# Patient Record
Sex: FEMALE | Race: WHITE | Employment: FULL TIME | URBAN - METROPOLITAN AREA
[De-identification: names, ages, dates, MRNs, and addresses within clinical notes are randomized per-mention and may not be internally consistent; named-entity substitution may affect disease eponyms.]

---

## 2017-06-08 ENCOUNTER — OFFICE VISIT (OUTPATIENT)
Dept: INTERNAL MEDICINE CLINIC | Age: 52
End: 2017-06-08

## 2017-06-08 VITALS
WEIGHT: 190.5 LBS | BODY MASS INDEX: 37.4 KG/M2 | HEART RATE: 85 BPM | HEIGHT: 60 IN | RESPIRATION RATE: 16 BRPM | OXYGEN SATURATION: 98 % | SYSTOLIC BLOOD PRESSURE: 116 MMHG | DIASTOLIC BLOOD PRESSURE: 80 MMHG | TEMPERATURE: 98 F

## 2017-06-08 DIAGNOSIS — Z12.11 COLON CANCER SCREENING: ICD-10-CM

## 2017-06-08 DIAGNOSIS — Z00.00 ROUTINE GENERAL MEDICAL EXAMINATION AT A HEALTH CARE FACILITY: Primary | ICD-10-CM

## 2017-06-08 PROBLEM — E66.01 MORBID OBESITY (HCC): Status: ACTIVE | Noted: 2017-06-08

## 2017-06-08 RX ORDER — PHENTERMINE HYDROCHLORIDE 37.5 MG/1
CAPSULE ORAL
COMMUNITY
Start: 2017-05-09 | End: 2017-06-08

## 2017-06-08 NOTE — PROGRESS NOTES
HISTORY OF PRESENT ILLNESS  Man Dunbar is a 54 y.o. female. 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 1 Inhaler 3    CYANOCOBALAMIN, VITAMIN B-12, (VITAMIN B-12 PO) Take 5,000 mcg by mouth daily.  CHOLECALCIFEROL, VITAMIN D3, (VITAMIN D3 PO) Take 5,000 Units by mouth.  thyroid, Pork, (ARMOUR THYROID) 60 mg tablet Take  by mouth daily.  butalbital-acetaminophen-caffeine (FIORICET) -40 mg per tablet Take 1 tablet by mouth every six (6) hours as needed for Pain. 20 tablet 2     Allergies   Allergen Reactions    Latex Rash    Tylenol [Acetaminophen] Other (comments)     Sleepiness. So avoids but can use exedrin which has tylenol. Social History   Substance Use Topics    Smoking status: Never Smoker    Smokeless tobacco: Never Used    Alcohol use 1.0 oz/week     2 Shots of liquor per week           ROS    Physical Exam   Constitutional: She appears well-developed and well-nourished. Appears stated age, obese, nad   Cardiovascular: Normal rate, regular rhythm and normal heart sounds. Exam reveals no gallop and no friction rub. No murmur heard. Pulmonary/Chest: Effort normal and breath sounds normal. No respiratory distress. She has no wheezes. Abdominal: Soft. Bowel sounds are normal.   Musculoskeletal: She exhibits no edema. Neurological: She is alert. Psychiatric: She has a normal mood and affect. Nursing note and vitals reviewed. ASSESSMENT and Heather Sarkar was seen today for complete physical.    Diagnoses and all orders for this visit:    Routine general medical examination at a health care facility   utd screening labs   Will work on diet and exercise for weight reduction   Continue to see gyn MD annually for well woman care   Refer to colonoscopy when turns 52 this year    Prediabetes   Per Dr Jaja Angulo  rtc 1 year    Follow-up Disposition:  Return in about 1 year (around 6/8/2018) for cpe.

## 2017-06-08 NOTE — MR AVS SNAPSHOT
Visit Information Date & Time Provider Department Dept. Phone Encounter #  
 6/8/2017  9:30 AM Darci Vo, [address] [place] [address],[address] [phone] [id number] Follow-up Instructions Return in about 1 year (around 6/8/2018) for cpe. Upcoming Health Maintenance Date Due  
 PAP AKA CERVICAL CYTOLOGY 4/14/2017 INFLUENZA AGE 9 TO ADULT 8/1/2017 DTaP/Tdap/Td series (2 - Td) 6/7/2026 Allergies as of 6/8/2017  Review Complete On: 6/8/2017 By: Phineas Loss, LPN Severity Noted Reaction Type Reactions Latex  11/19/2010    Rash Tylenol [Acetaminophen]  11/19/2010    Other (comments) Sleepiness. So avoids but can use exedrin which has tylenol. Current Immunizations  Reviewed on 6/7/2016 Name Date Influenza Vaccine 10/15/2014, 9/27/2013 Influenza Vaccine Split 10/19/2010 Tdap 6/7/2016 Not reviewed this visit You Were Diagnosed With   
  
 Codes Comments Routine general medical examination at a health care facility    -  Primary ICD-10-CM: Z00.00 ICD-9-CM: V70.0 Colon cancer screening     ICD-10-CM: Z12.11 ICD-9-CM: V76.51 Vitals BP Pulse Temp Resp Height(growth percentile) Weight(growth percentile) 116/80 (BP 1 Location: Left arm, BP Patient Position: Sitting) 85 98 °F (36.7 °C) (Oral) 16 5' (1.524 m) 190 lb 8 oz (86.4 kg) LMP SpO2 BMI OB Status Smoking Status (Approximate) 98% 37.2 kg/m2 Having regular periods Never Smoker BMI and BSA Data Body Mass Index Body Surface Area  
 37.2 kg/m 2 1.91 m 2 Preferred Pharmacy Pharmacy Name Phone [facility] [address] [phone] Your Updated Medication List  
  
   
This list is accurate as of: 6/8/17 10:31 AM.  Always use your most recent med list.  
  
  
  
  
 albuterol 90 mcg/actuation inhaler Commonly known as:  PROAIR HFA Take 1 Puff by inhalation every four (4) hours as needed for Wheezing or Shortness of Breath. ARMOUR THYROID 60 mg tablet Generic drug:  thyroid (Pork) Take  by mouth daily. butalbital-acetaminophen-caffeine -40 mg per tablet Commonly known as:  [employer] Take 1 tablet by mouth every six (6) hours as needed for Pain. VITAMIN B-12 PO Take 5,000 mcg by mouth daily. VITAMIN D3 PO Take 5,000 Units by mouth. We Performed the Following REFERRAL TO GASTROENTEROLOGY [ILL77 Custom] Comments:  
 Please evaluate patient for screening colonoscopy Follow-up Instructions Return in about 1 year (around 6/8/2018) for cpe. Referral Information Referral ID Referred By Referred To  
  
 [id number] [place], [address]   
   [address] [facility] [address], [address] [facility] Visits Status Start Date End Date 1 New Request 6/8/17 6/8/18 If your referral has a status of pending review or denied, additional information will be sent to support the outcome of this decision. Introducing [facility] & HEALTH SERVICES! Dear Matheus Call: Thank you for requesting a [employer] account. Our records indicate that you already have an active [employer] account. You can access your account anytime at [email or web address] Did you know that you can access your hospital and ER discharge instructions at any time in [employer]? You can also review all of your test results from your hospital stay or ER visit. Additional Information If you have questions, please visit the Frequently Asked Questions section of the [employer] website at [email or web address] Remember, [employer] is NOT to be used for urgent needs. For medical emergencies, dial 911. Now available from your iPhone and Android! Please provide this summary of care documentation to your next provider. Your primary care clinician is listed as Reyna GOINS.  If you have any questions after today's visit, please call [phone].

## 2017-06-08 NOTE — PROGRESS NOTES
Chief Complaint   Patient presents with    Complete Physical     Reviewed record In preparation for visit and have obtained necessary documentation    1. Have you been to the ER, urgent care clinic since your last visit? Hospitalized since your last visit? NO    2. Have you seen or consulted any other health care providers outside of the [facility] since your last visit? Include any pap smears or colon screening. NO    Patient does not have advance directive on file and has received paperwork.

## 2017-07-07 ENCOUNTER — OFFICE VISIT (OUTPATIENT)
Dept: INTERNAL MEDICINE CLINIC | Age: 52
End: 2017-07-07

## 2017-07-07 ENCOUNTER — HOSPITAL ENCOUNTER (OUTPATIENT)
Dept: GENERAL RADIOLOGY | Age: 52
Discharge: HOME | End: 2017-07-07
Payer: COMMERCIAL

## 2017-07-07 VITALS
SYSTOLIC BLOOD PRESSURE: 110 MMHG | OXYGEN SATURATION: 95 % | RESPIRATION RATE: 16 BRPM | HEIGHT: 60 IN | TEMPERATURE: 98.5 F | DIASTOLIC BLOOD PRESSURE: 72 MMHG | HEART RATE: 96 BPM | BODY MASS INDEX: 38.24 KG/M2 | WEIGHT: 194.8 LBS

## 2017-07-07 DIAGNOSIS — R04.2 HEMOPTYSIS: Primary | ICD-10-CM

## 2017-07-07 DIAGNOSIS — R04.2 HEMOPTYSIS: ICD-10-CM

## 2017-07-07 PROCEDURE — 71020 XR CHEST PA LAT: CPT

## 2017-07-07 RX ORDER — AMOXICILLIN AND CLAVULANATE POTASSIUM 875; 125 MG/1; MG/1
1 TABLET, FILM COATED ORAL EVERY 12 HOURS
Qty: 14 TAB | Refills: 0 | Status: SHIPPED | OUTPATIENT
Start: 2017-07-07 | End: 2017-07-14

## 2017-07-07 NOTE — PROGRESS NOTES
Please call the patient and let the patient know that her test result(s) is/are normal.  Thanks. David Armenta.

## 2017-08-27 ENCOUNTER — PATIENT MESSAGE (OUTPATIENT)
Dept: INTERNAL MEDICINE CLINIC | Age: 52
End: 2017-08-27

## 2017-08-27 DIAGNOSIS — E03.9 ACQUIRED HYPOTHYROIDISM: Primary | ICD-10-CM

## 2017-08-27 DIAGNOSIS — R73.03 PRE-DIABETES: ICD-10-CM

## 2017-08-28 NOTE — TELEPHONE ENCOUNTER
From: Betzy Flores  To: Chayito Mckeon MD  Sent: 8/27/2017 8:51 AM EDT  Subject: Referral Request    Hello! Due to bad service I have left Dr. Royal Rasmussen, [facility], who I was working with for my thyroid and pre-diabetes. I contacted [facility] several months ago regarding an appt with Internal Medicine Endocrinology. I received my first communication from [address] [place] [address] and they have requested a referral from my primary physician which is you. Address is: [facility], [facility], [address] [place] [address], Attn: Elizabeth Bhagat. Thank you for your time and attention to this item. Please let me know if you have any questions. Betzy Flores (h) [phone] (w) [phone].

## 2017-08-28 NOTE — TELEPHONE ENCOUNTER
Per Dr. Marcela Yeung for referral to endocrinology at [facility], [facility], [address] [place] [address] per patients request.

## 2017-10-03 ENCOUNTER — HOSPITAL ENCOUNTER (OUTPATIENT)
Dept: DIABETES SERVICES | Age: 52
Discharge: HOME | End: 2017-10-03

## 2017-10-03 DIAGNOSIS — R73.03 PREDIABETES: Primary | ICD-10-CM

## 2017-10-03 NOTE — PROGRESS NOTES
Was asked by Luis Alberto Segura with the Diabetes [facility] to put in an order for pre- dm education as she and her  are there for education.  Received verbal order from Dr. Evelia Caceres to put in the referral.

## 2017-10-04 ENCOUNTER — TELEPHONE (OUTPATIENT)
Dept: INTERNAL MEDICINE CLINIC | Age: 52
End: 2017-10-04

## 2017-10-04 NOTE — TELEPHONE ENCOUNTER
----- Message from Mayra Verdugo sent at 10/4/2017  1:26 PM EDT -----  Regarding: Dr. Martita Szymanski,  is calling on behalf of pt regarding the pt getting lab work done during her upcoming appt scheduled for 12/14/17. This appt will replace the endocrinology appt. Pt needs to have lab work done prior to the appt. Mr. Ella Drummond can be reached at [phone].     Message copied/pasted from [facility]

## 2017-10-04 NOTE — DIABETES MGMT
DTC Progress Note    Recommendations/ Comments: Pt encouraged to continue to follow-up with PCP for evaluation of her pre-DM and contact educator is she has any questions in the future. Chart reviewed on [employer]. Patient is a 54 y.o. female with known Pre-DM and thyroid disorder. Pt is seen individually due to pre-DM and no classes. Pt and her , Stevenson Wagner are seen together per their request for education. Both shared that they are struggling with wt loss. We discussed risk factors for pre-DM and guidelines of 5-7% of current wt as recommended goal to improve insulin resistance. [facility] reports that she has started walking at home and being more active on the weekend. She is struggling to find time in her day to include activity. We discussed FITT principle and how this could be used to overcome plateaus in wt loss. We also discussed how the exercise will assist with decreasing insulin resistance. Provided [facility] with chair based exercises that she may find helpful for not just flexibility but also with stress management at work. We discussed taking \"activity breaks\" every 90 minutes. Pt shared that \"I have a window in my office and they are going to wonder what is she doing in there? \"       Pt is currently eating 3 meals - using appropriate portions of protein, including carbohydrates with meals. Per [facility], she is finding it hard to meet guidelines of 1/2 plate vegetable as Stevenson Wagner is primarily responsible for preparing meals. She shared that she is trying to add salads, steamed veggies w/in her preferences but still knows there is room for improvement. She is often including fruit with other meals. She express concern about not being able to have light yogurt or oatmeal which she had been trying to include with breakfasts. She asked appropriate questions related to sweets and sugar substitutes.   She has been trying to limit her kcal intakes - at times severely restricting kcal to <1000 per day. We discussed the negative impact this may have on glycemic control and hepatic release of glycogen. A1c:   Sherrill Ortega provided her recent lab work for review with LIEN PRITCHETT today - she keeps everything with her and has been following her A1c. Hemoglobin A1c (completed by lab per MD order 6.27.17) 6%.     Lab Results   Component Value Date/Time    Creatinine 1.01 12/20/2010 12:00 AM     Thank you  Clarence PRITCHETT

## 2017-10-05 DIAGNOSIS — Z00.00 ROUTINE GENERAL MEDICAL EXAMINATION AT A HEALTH CARE FACILITY: Primary | ICD-10-CM

## 2017-10-31 ENCOUNTER — TELEPHONE (OUTPATIENT)
Dept: INTERNAL MEDICINE CLINIC | Age: 52
End: 2017-10-31

## 2017-10-31 RX ORDER — LEVOTHYROXINE AND LIOTHYRONINE 38; 9 UG/1; UG/1
15 TABLET ORAL DAILY
Qty: 90 TAB | Refills: 3 | Status: SHIPPED | OUTPATIENT
Start: 2017-10-31

## 2017-10-31 NOTE — TELEPHONE ENCOUNTER
Corinne Deleon would like a call back regarding patient's Thyroid medication.  Contact is [phone]         Message received & copied from [facility]

## 2017-11-03 ENCOUNTER — TELEPHONE (OUTPATIENT)
Dept: INTERNAL MEDICINE CLINIC | Age: 52
End: 2017-11-03

## 2017-11-03 ENCOUNTER — DOCUMENTATION ONLY (OUTPATIENT)
Dept: INTERNAL MEDICINE CLINIC | Age: 52
End: 2017-11-03

## 2017-11-03 ENCOUNTER — TELEPHONE (OUTPATIENT)
Dept: NEUROLOGY | Age: 52
End: 2017-11-03

## 2017-11-03 NOTE — TELEPHONE ENCOUNTER
Spoke with patient after 2 patient identifiers being note and advised per Dr. Lula Marquis that he wanted to see her. Patient expressed understanding and has no further questions at this time.

## 2017-11-03 NOTE — TELEPHONE ENCOUNTER
Jenna Mclean can see her on Monday, 13 November, but see if we have a cancellation or a spot to put her in if anybody else has an opening next week

## 2017-11-03 NOTE — TELEPHONE ENCOUNTER
Patient called states that she had a colonoscopy today as she was coming through she had a seizure, she contacted her PCP Elan Arenas who spk with .  Patient was told that Nargis Parks would fit her in for an apptmt

## 2017-11-03 NOTE — PROGRESS NOTES
Callled by Dr Ondina Cote at [address] [facility] endoscopy center. Pt has colonoscopy this morning and after completion in recovery and seizure lasting 5 minutes with arching of back and eyes rolled back. Pt did not recieive any ativan or versed to stop the seizure. No respiratory distress per MD. Pt and  refused to go to ED. We have called the patient today and she refuses to come in to our office for evaluation. She is refusing to restart topamax. Pt states she was still awake and alert when the seizure occurred today. I have placed a call to the neurologists office. I spoke with neurologist Dr Homero Kimbrough who has offered to have pt worked in for an appt soon for sz vs pseudoseizure.   It was reiterated to patient--no driving for 6 months or until she sees neurologist  Pt states she will call the neurologists office for the appt

## 2017-11-06 NOTE — TELEPHONE ENCOUNTER
I spoke with the patient and scheduled her for a follow up with Dr Ilda Goldberg as he had an opening tomorrow

## 2017-11-07 ENCOUNTER — OFFICE VISIT (OUTPATIENT)
Dept: NEUROLOGY | Age: 52
End: 2017-11-07

## 2017-11-07 VITALS
SYSTOLIC BLOOD PRESSURE: 128 MMHG | DIASTOLIC BLOOD PRESSURE: 70 MMHG | HEART RATE: 70 BPM | OXYGEN SATURATION: 98 % | BODY MASS INDEX: 38.86 KG/M2 | WEIGHT: 199 LBS

## 2017-11-07 DIAGNOSIS — G25.3 MULTIFOCAL MYOCLONUS: Primary | ICD-10-CM

## 2017-11-07 NOTE — MR AVS SNAPSHOT
Visit Information Date & Time Provider Department Dept. Phone Encounter #  
 11/7/2017  9:40 AM Chandler Clemons MD [facility] [phone] [id number] Your Appointments 12/14/2017  9:00 AM  
ROUTINE CARE with Luz Elena Weller, [address] [facility]) Appt Note: f/up [facility] [address] [facility]  
[phone]  
  
   
 [facility] [address] [facility]  [address] [address]  
  
    
 6/15/2018  8:15 AM  
ROUTINE CARE with Luz Elena Weller, [address] [facility]) Appt Note: f/u yearly [facility] [address] [facility]  
[phone] Upcoming Health Maintenance Date Due  
 PAP AKA CERVICAL CYTOLOGY 4/14/2017 BREAST CANCER SCRN MAMMOGRAM 10/12/2017 FOBT Q 1 YEAR AGE 50-75 10/12/2017 DTaP/Tdap/Td series (2 - Td) 6/7/2026 Allergies as of 11/7/2017  Review Complete On: 11/7/2017 By: Willam Brown LPN Severity Noted Reaction Type Reactions Latex  11/19/2010    Rash Other Medication  07/07/2017    Other (comments) Steroids Tylenol [Acetaminophen]  11/19/2010    Other (comments) Sleepiness. So avoids but can use exedrin which has tylenol. Current Immunizations  Reviewed on 6/7/2016 Name Date Influenza Vaccine 10/12/2017, 10/15/2014, 9/27/2013 Influenza Vaccine Split 10/19/2010 Tdap 6/7/2016 Not reviewed this visit Vitals BP Pulse Weight(growth percentile) SpO2 BMI OB Status 128/70 70 199 lb (90.3 kg) 98% 38.86 kg/m2 Having regular periods Smoking Status Never Smoker Vitals History BMI and BSA Data Body Mass Index Body Surface Area  
 38.86 kg/m 2 1.96 m 2 Preferred Pharmacy Pharmacy Name Phone [facility] [address] [place] [phone] Your Updated Medication List  
  
   
 This list is accurate as of: 11/7/17  9:40 AM.  Always use your most recent med list.  
  
  
  
  
 albuterol 90 mcg/actuation inhaler Commonly known as:  PROAIR HFA Take 1 Puff by inhalation every four (4) hours as needed for Wheezing or Shortness of Breath. thyroid (Pork) 60 mg tablet Commonly known as:  ARMOUR THYROID Take 0.25 Tabs by mouth daily. VITAMIN B-12 PO Take 5,000 mcg by mouth daily. VITAMIN D3 PO Take 5,000 Units by mouth. Patient Instructions PRESCRIPTION REFILL POLICY [facility] Statement to Patients April 1, 2014 In an effort to ensure the large volume of patient prescription refills is processed in the most efficient and expeditious manner, we are asking our patients to assist us by calling your Pharmacy for all prescription refills, this will include also your  Mail Order Pharmacy. The pharmacy will contact our office electronically to continue the refill process. Please do not wait until the last minute to call your pharmacy. We need at least 48 hours (2days) to fill prescriptions. We also encourage you to call your pharmacy before going to  your prescription to make sure it is ready. With regard to controlled substance prescription refill requests (narcotic refills) that need to be picked up at our office, we ask your cooperation by providing us with at least 72 hours (3days) notice that you will need a refill. We will not refill narcotic prescription refill requests after 4:00pm on any weekday, Monday through Thursday, or after 2:00pm on Fridays, or on the weekends. We encourage everyone to explore another way of getting your prescription refill request processed using [employer], our patient web portal through our electronic medical record system.  [employer] is an efficient and effective way to communicate your medication request directly to the office and downloadable as an toi on your smart phone . [employer] also features a review functionality that allows you to view your medication list as well as leave messages for your physician. Are you ready to get connected? If so please review the attatched instructions or speak to any of our staff to get you set up right away! Thank you so much for your cooperation. Should you have any questions please contact our Practice Administrator. The Physicians and Staff,  [facility] Introducing [facility] & [facility]! Dear Kenda Paget: Thank you for requesting a [employer] account. Our records indicate that you already have an active [employer] account. You can access your account anytime at [email or web address] Did you know that you can access your hospital and ER discharge instructions at any time in [employer]? You can also review all of your test results from your hospital stay or ER visit. Additional Information If you have questions, please visit the Frequently Asked Questions section of the [employer] website at [email or web address] Remember, [employer] is NOT to be used for urgent needs. For medical emergencies, dial 911. Now available from your iPhone and Android! Please provide this summary of care documentation to your next provider. Your primary care clinician is listed as Samuel GOINS. If you have any questions after today's visit, please call [phone].

## 2017-11-07 NOTE — PROGRESS NOTES
HISTORY OF PRESENT ILLNESS  Shelby Mayes is a 50 y.o. female. HPI Comments: Shelby Mayes is a 81-year-old right-handed  female who is here today for an episode after a colonoscopy that was thought at the time to be a seizure. She has a history of behavioral seizures. She was told at [facility] that she had these. The patient is relatively well adjusted, ,  employed. She remembers the entire episode. She remembers her arms jerking and twitching she remembers the staff running around at the time. The entire episode was fairly short and she was able to dress herself and leave after the colonoscopy. In 2009 she was evaluated at [address] [place] [address] and found to have abnormal movements when hooked up to the EEG and exposed to photic stimulation. She is well now and not on any anticonvulsants. Seizure    The history is provided by the spouse. Review of Systems   Constitutional: Negative. Cardiovascular: Negative. Psychiatric/Behavioral: Negative. Current Outpatient Prescriptions on File Prior to Visit   Medication Sig Dispense Refill    thyroid, Pork, (ARMOUR THYROID) 60 mg tablet Take 0.25 Tabs by mouth daily. 90 Tab 3    albuterol (PROAIR HFA) 90 mcg/actuation inhaler Take 1 Puff by inhalation every four (4) hours as needed for Wheezing or Shortness of Breath. 1 Inhaler 3    CYANOCOBALAMIN, VITAMIN B-12, (VITAMIN B-12 PO) Take 5,000 mcg by mouth daily.  CHOLECALCIFEROL, VITAMIN D3, (VITAMIN D3 PO) Take 5,000 Units by mouth. No current facility-administered medications on file prior to visit.       Past Medical History:   Diagnosis Date    Asthma     Dizziness     Epilepsy ([address]     Migraine     Seizures ([facility])      Family History   Problem Relation Age of Onset    Psychiatric Disorder Mother      schizophrenia and multiple personality d/o    Cancer Maternal Grandmother     Heart Disease Maternal Grandmother     Stroke Maternal Grandmother     Cancer Maternal Grandfather     Cancer Paternal Grandmother     Cancer Paternal Grandfather     Diabetes Paternal Grandfather     Heart Disease Paternal Grandfather     Cancer Sister      /70  Pulse 70  Wt 199 lb (90.3 kg)  SpO2 98%  BMI 38.86 kg/m2      Physical Exam   Constitutional: She is oriented to person, place, and time. She appears well-developed and well-nourished. No distress. HENT:   Head: Normocephalic and atraumatic. Mouth/Throat: Oropharyngeal exudate present. Eyes: Conjunctivae and EOM are normal. Pupils are equal, round, and reactive to light. No scleral icterus. Neck: Normal range of motion. Neck supple. No thyromegaly present. Cardiovascular: Normal rate, regular rhythm and normal heart sounds. No murmur heard. Musculoskeletal: Normal range of motion. She exhibits no edema, tenderness or deformity. Lymphadenopathy:     She has no cervical adenopathy. Neurological: She is alert and oriented to person, place, and time. She has normal strength and normal reflexes. She displays no atrophy and no tremor. No cranial nerve deficit or sensory deficit. She exhibits normal muscle tone. She displays a negative Romberg sign. Coordination and gait normal. She displays no Babinski's sign on the right side. She displays no Babinski's sign on the left side. Speech,language and mentation are normal  Visual fields are full to confrontation, funduscopic exam reveals flat discs,  the retina and vasculature are normal     Skin: Skin is warm and dry. No rash noted. She is not diaphoretic. No erythema. Psychiatric: She has a normal mood and affect. Her behavior is normal. Judgment and thought content normal.   Vitals reviewed. ASSESSMENT and PLAN  SEIZURE LIKE EPISODE  This was not a generalized seizure, the patient was conscious for the entire episode. I am not sure whether this represents some kind of mild clonus or an actual behavioral spell.   The patient is remarkably well adjusted as compared to the typical pseudoseizure patient and this was right after propofol anesthesia. I see no reason for further workup at this time. I would probably avoid propofol in the future. We will see her back on an as-needed basis. This note will not be viewable in [address]

## 2017-11-07 NOTE — LETTER
11/7/2017 9:50 AM 
 
Patient:  Kam Wagner YOB: 1965 Date of Visit: 11/7/2017 Dear Cheikh Harris MD 
[address] [address] VIA In Basket 
 : Thank you for referring Ms. Kam Wagner to me for evaluation/treatment. Below are the relevant portions of my assessment and plan of care. HISTORY OF PRESENT ILLNESS Kam Wagner is a 50 y.o. female. HPI Comments: Kam Wagner is a 53-year-old right-handed  female who is here today for an episode after a colonoscopy that was thought at the time to be a seizure. She has a history of behavioral seizures. She was told at [facility] that she had these. The patient is relatively well adjusted, ,  employed. She remembers the entire episode. She remembers her arms jerking and twitching she remembers the staff running around at the time. The entire episode was fairly short and she was able to dress herself and leave after the colonoscopy. In 2009 she was evaluated at [facility] and found to have abnormal movements when hooked up to the EEG and exposed to photic stimulation. She is well now and not on any anticonvulsants. Seizure The history is provided by the spouse. Review of Systems Constitutional: Negative. Cardiovascular: Negative. Psychiatric/Behavioral: Negative. Current Outpatient Prescriptions on File Prior to Visit Medication Sig Dispense Refill  thyroid, Pork, (ARMOUR THYROID) 60 mg tablet Take 0.25 Tabs by mouth daily. 90 Tab 3  
 albuterol (PROAIR HFA) 90 mcg/actuation inhaler Take 1 Puff by inhalation every four (4) hours as needed for Wheezing or Shortness of Breath. 1 Inhaler 3  
 CYANOCOBALAMIN, VITAMIN B-12, (VITAMIN B-12 PO) Take 5,000 mcg by mouth daily.  CHOLECALCIFEROL, VITAMIN D3, (VITAMIN D3 PO) Take 5,000 Units by mouth. No current facility-administered medications on file prior to visit. Past Medical History:  
Diagnosis Date  Asthma  Dizziness  Epilepsy ([facility]  Migraine  Seizures ([facility] Family History Problem Relation Age of Onset  Psychiatric Disorder Mother   
  schizophrenia and multiple personality d/o  Cancer Maternal Grandmother  Heart Disease Maternal Grandmother  Stroke Maternal Grandmother  Cancer Maternal Grandfather  Cancer Paternal Grandmother  Cancer Paternal Grandfather  Diabetes Paternal Grandfather  Heart Disease Paternal Grandfather  Cancer Sister /70  Pulse 70  Wt 199 lb (90.3 kg)  SpO2 98%  BMI 38.86 kg/m2 Physical Exam  
Constitutional: She is oriented to person, place, and time. She appears well-developed and well-nourished. No distress. HENT:  
Head: Normocephalic and atraumatic. Mouth/Throat: Oropharyngeal exudate present. Eyes: Conjunctivae and EOM are normal. Pupils are equal, round, and reactive to light. No scleral icterus. Neck: Normal range of motion. Neck supple. No thyromegaly present. Cardiovascular: Normal rate, regular rhythm and normal heart sounds. No murmur heard. Musculoskeletal: Normal range of motion. She exhibits no edema, tenderness or deformity. Lymphadenopathy:  
  She has no cervical adenopathy. Neurological: She is alert and oriented to person, place, and time. She has normal strength and normal reflexes. She displays no atrophy and no tremor. No cranial nerve deficit or sensory deficit. She exhibits normal muscle tone. She displays a negative Romberg sign. Coordination and gait normal. She displays no Babinski's sign on the right side. She displays no Babinski's sign on the left side. Speech,language and mentation are normal 
Visual fields are full to confrontation, funduscopic exam reveals flat discs,  the retina and vasculature are normal 
  
Skin: Skin is warm and dry. No rash noted. She is not diaphoretic. No erythema. Psychiatric: She has a normal mood and affect. Her behavior is normal. Judgment and thought content normal.  
Vitals reviewed. ASSESSMENT and PLAN 
SEIZURE LIKE EPISODE This was not a generalized seizure, the patient was conscious for the entire episode. I am not sure whether this represents some kind of mild clonus or an actual behavioral spell. The patient is remarkably well adjusted as compared to the typical pseudoseizure patient and this was right after propofol anesthesia. I see no reason for further workup at this time. I would probably avoid propofol in the future. We will see her back on an as-needed basis. This note will not be viewable in [address] If you have questions, please do not hesitate to call me. I look forward to following Ms. Hall along with you. Sincerely, Chana Baires MD

## 2017-11-07 NOTE — PATIENT INSTRUCTIONS
[address] [facility]   Statement to Patients  April 1, 2014      In an effort to ensure the large volume of patient prescription refills is processed in the most efficient and expeditious manner, we are asking our patients to assist us by calling your Pharmacy for all prescription refills, this will include also your  Mail Order Pharmacy. The pharmacy will contact our office electronically to continue the refill process. Please do not wait until the last minute to call your pharmacy. We need at least 48 hours (2days) to fill prescriptions. We also encourage you to call your pharmacy before going to  your prescription to make sure it is ready. With regard to controlled substance prescription refill requests (narcotic refills) that need to be picked up at our office, we ask your cooperation by providing us with at least 72 hours (3days) notice that you will need a refill. We will not refill narcotic prescription refill requests after 4:00pm on any weekday, Monday through Thursday, or after 2:00pm on Fridays, or on the weekends. We encourage everyone to explore another way of getting your prescription refill request processed using [employer], our patient web portal through our electronic medical record system. [employer] is an efficient and effective way to communicate your medication request directly to the office and  downloadable as an toi on your smart phone . [employer] also features a review functionality that allows you to view your medication list as well as leave messages for your physician. Are you ready to get connected? If so please review the attatched instructions or speak to any of our staff to get you set up right away! Thank you so much for your cooperation. Should you have any questions please contact our Practice Administrator.     The Physicians and Staff,  [facility]

## 2017-11-22 ENCOUNTER — TELEPHONE (OUTPATIENT)
Dept: INTERNAL MEDICINE CLINIC | Age: 52
End: 2017-11-22

## 2017-11-22 NOTE — LETTER
11/24/2017 10:37 AM 
 
Ms. Robles Guest [address] [place] [address] [phone] Lab orders per you request

## 2017-11-22 NOTE — TELEPHONE ENCOUNTER
James Tang, pt's . Pt has appt. scheduled 12/14/17. Needs a rx for blood work mailed to her.      Best contact #: [phone]       Message received & copied from [facility]

## 2017-12-09 LAB
25(OH)D3+25(OH)D2 SERPL-MCNC: 44.3 NG/ML (ref 30–100)
ALBUMIN SERPL-MCNC: 4.1 G/DL (ref 3.5–5.5)
ALBUMIN/GLOB SERPL: 1.6 {RATIO} (ref 1.2–2.2)
ALP SERPL-CCNC: 78 IU/L (ref 39–117)
ALT SERPL-CCNC: 17 IU/L (ref 0–32)
AST SERPL-CCNC: 15 IU/L (ref 0–40)
BILIRUB SERPL-MCNC: 0.4 MG/DL (ref 0–1.2)
BUN SERPL-MCNC: 20 MG/DL (ref 6–24)
BUN/CREAT SERPL: 24 (ref 9–23)
CALCIUM SERPL-MCNC: 9.7 MG/DL (ref 8.7–10.2)
CHLORIDE SERPL-SCNC: 103 MMOL/L (ref 96–106)
CHOLEST SERPL-MCNC: 166 MG/DL (ref 100–199)
CO2 SERPL-SCNC: 25 MMOL/L (ref 18–29)
CREAT SERPL-MCNC: 0.82 MG/DL (ref 0.57–1)
ERYTHROCYTE [DISTWIDTH] IN BLOOD BY AUTOMATED COUNT: 16.1 % (ref 12.3–15.4)
EST. AVERAGE GLUCOSE BLD GHB EST-MCNC: 123 MG/DL
GFR SERPLBLD CREATININE-BSD FMLA CKD-EPI: 84 ML/MIN/1.73
GFR SERPLBLD CREATININE-BSD FMLA CKD-EPI: 96 ML/MIN/1.73
GLOBULIN SER CALC-MCNC: 2.5 G/DL (ref 1.5–4.5)
GLUCOSE SERPL-MCNC: 93 MG/DL (ref 65–99)
HBA1C MFR BLD: 5.9 % (ref 4.8–5.6)
HCT VFR BLD AUTO: 39.1 % (ref 34–46.6)
HDLC SERPL-MCNC: 38 MG/DL
HGB BLD-MCNC: 13 G/DL (ref 11.1–15.9)
LDLC SERPL CALC-MCNC: 103 MG/DL (ref 0–99)
MCH RBC QN AUTO: 27.9 PG (ref 26.6–33)
MCHC RBC AUTO-ENTMCNC: 33.2 G/DL (ref 31.5–35.7)
MCV RBC AUTO: 84 FL (ref 79–97)
PLATELET # BLD AUTO: 334 X10E3/UL (ref 150–379)
POTASSIUM SERPL-SCNC: 4.5 MMOL/L (ref 3.5–5.2)
PROT SERPL-MCNC: 6.6 G/DL (ref 6–8.5)
RBC # BLD AUTO: 4.66 X10E6/UL (ref 3.77–5.28)
SODIUM SERPL-SCNC: 142 MMOL/L (ref 134–144)
T4 FREE SERPL-MCNC: 0.91 NG/DL (ref 0.82–1.77)
TRIGL SERPL-MCNC: 127 MG/DL (ref 0–149)
VLDLC SERPL CALC-MCNC: 25 MG/DL (ref 5–40)
WBC # BLD AUTO: 7.2 X10E3/UL (ref 3.4–10.8)

## 2017-12-12 LAB
SPECIMEN STATUS REPORT, ROLRST: NORMAL
TSH SERPL DL<=0.005 MIU/L-ACNC: 1.05 UIU/ML (ref 0.45–4.5)

## 2017-12-13 PROBLEM — E03.8 OTHER SPECIFIED HYPOTHYROIDISM: Status: ACTIVE | Noted: 2017-12-13

## 2017-12-14 ENCOUNTER — OFFICE VISIT (OUTPATIENT)
Dept: INTERNAL MEDICINE CLINIC | Age: 52
End: 2017-12-14

## 2017-12-14 VITALS
DIASTOLIC BLOOD PRESSURE: 72 MMHG | HEIGHT: 60 IN | WEIGHT: 199 LBS | SYSTOLIC BLOOD PRESSURE: 110 MMHG | TEMPERATURE: 97.6 F | BODY MASS INDEX: 39.07 KG/M2 | HEART RATE: 86 BPM | OXYGEN SATURATION: 97 %

## 2017-12-14 DIAGNOSIS — E03.8 OTHER SPECIFIED HYPOTHYROIDISM: Primary | ICD-10-CM

## 2017-12-14 DIAGNOSIS — R73.03 PREDIABETES: ICD-10-CM

## 2017-12-14 DIAGNOSIS — R56.9 CONVULSIONS, UNSPECIFIED CONVULSION TYPE (HCC): ICD-10-CM

## 2017-12-14 NOTE — MR AVS SNAPSHOT
Visit Information Date & Time Provider Department Dept. Phone Encounter #  
 12/14/2017  9:00 AM [facility] [address] [phone] [id number] Follow-up Instructions Return if symptoms worsen or fail to improve. Your Appointments 6/15/2018  8:15 AM  
ROUTINE CARE with Leandro Silva, [address] [address] [facility]) Appt Note: f/u yearly [address] [address]  
[address] [facility] [address] [place] [address] Upcoming Health Maintenance Date Due  
 PAP AKA CERVICAL CYTOLOGY 4/14/2017 DTaP/Tdap/Td series (2 - Td) 6/7/2026 COLONOSCOPY 11/3/2027 Allergies as of 12/14/2017  Review Complete On: 12/14/2017 By: Valerie Mason LPN Severity Noted Reaction Type Reactions Latex  11/19/2010    Rash Other Medication  07/07/2017    Other (comments) Steroids Tylenol [Acetaminophen]  11/19/2010    Other (comments) Sleepiness. So avoids but can use exedrin which has tylenol. Current Immunizations  Reviewed on 6/7/2016 Name Date Influenza Vaccine 10/12/2017, 10/15/2014, 9/27/2013 Influenza Vaccine Split 10/19/2010 Tdap 6/7/2016 Not reviewed this visit You Were Diagnosed With   
  
 Codes Comments Other specified hypothyroidism    -  Primary ICD-10-CM: E03.8 ICD-9-CM: 244.8 Prediabetes     ICD-10-CM: R73.03 
ICD-9-CM: 790.29 Convulsions, unspecified convulsion type [facility]     ICD-10-CM: R56.9 ICD-9-CM: 780.39 Vitals BP Pulse Temp Height(growth percentile) Weight(growth percentile) 110/72 (BP 1 Location: Left arm, BP Patient Position: Sitting) 86 97.6 °F (36.4 °C) (Oral) 5' (1.524 m) 199 lb (90.3 kg) SpO2 BMI OB Status Smoking Status 97% 38.86 kg/m2 Having regular periods Never Smoker BMI and BSA Data  Body Mass Index Body Surface Area  
 38.86 kg/m 2 1.96 m 2  
  
  
 Preferred Pharmacy Pharmacy Name Phone [facility] [address] [phone] Your Updated Medication List  
  
   
This list is accurate as of: 12/14/17  9:47 AM.  Always use your most recent med list.  
  
  
  
  
 albuterol 90 mcg/actuation inhaler Commonly known as:  PROAIR HFA Take 1 Puff by inhalation every four (4) hours as needed for Wheezing or Shortness of Breath. thyroid (Pork) 60 mg tablet Commonly known as:  ARMOUR THYROID Take 0.25 Tabs by mouth daily. VITAMIN B-12 PO Take 5,000 mcg by mouth daily. VITAMIN D3 PO Take 5,000 Units by mouth. Follow-up Instructions Return if symptoms worsen or fail to improve. Introducing [facility]! Dear Jose Francisco Dhillon: Thank you for requesting a [employer] account. Our records indicate that you already have an active [employer] account. You can access your account anytime at [email or web address] Did you know that you can access your hospital and ER discharge instructions at any time in [employer]? You can also review all of your test results from your hospital stay or ER visit. Additional Information If you have questions, please visit the Frequently Asked Questions section of the [employer] website at [email or web address] Remember, [employer] is NOT to be used for urgent needs. For medical emergencies, dial 911. Now available from your iPhone and Android! Please provide this summary of care documentation to your next provider. Your primary care clinician is listed as Trevon GOINS. If you have any questions after today's visit, please call [phone].

## 2017-12-14 NOTE — PROGRESS NOTES
HISTORY OF PRESENT ILLNESS  Charisse Hummel is a 50 y.o. female. HPI   F/u hypothyroidism ( Dr Tiki Ramirez previously) , hx migraines, prediabetes  Had colonoscopy last month and had seizure like episode after procedure--non epileptic seizures in the past.  No further w/u per neuro MD recnetly  Normal colon  Has recent normal labs  Moving back to [place], [place] next month    Patient Active Problem List    Diagnosis Date Noted    Other specified hypothyroidism 12/13/2017    Morbid obesity ([facility] 06/08/2017    Convulsion, non-epileptic ([facility] 10/15/2014    Prediabetes 09/17/2012    Migraine 11/19/2010    Goiter 11/19/2010     Current Outpatient Prescriptions   Medication Sig Dispense Refill    thyroid, Pork, (ARMOUR THYROID) 60 mg tablet Take 0.25 Tabs by mouth daily. 90 Tab 3    albuterol (PROAIR HFA) 90 mcg/actuation inhaler Take 1 Puff by inhalation every four (4) hours as needed for Wheezing or Shortness of Breath. 1 Inhaler 3    CYANOCOBALAMIN, VITAMIN B-12, (VITAMIN B-12 PO) Take 5,000 mcg by mouth daily.  CHOLECALCIFEROL, VITAMIN D3, (VITAMIN D3 PO) Take 5,000 Units by mouth. Allergies   Allergen Reactions    Latex Rash    Other Medication Other (comments)     Steroids     Tylenol [Acetaminophen] Other (comments)     Sleepiness. So avoids but can use exedrin which has tylenol.      Social History   Substance Use Topics    Smoking status: Never Smoker    Smokeless tobacco: Never Used    Alcohol use 1.0 oz/week     2 Shots of liquor per week      Lab Results  Component Value Date/Time   Hemoglobin A1c 5.9 12/08/2017 08:35 AM   Glucose 93 12/08/2017 08:35 AM   LDL, calculated 103 12/08/2017 08:35 AM   Creatinine 0.82 12/08/2017 08:35 AM      Lab Results  Component Value Date/Time   Cholesterol, total 166 12/08/2017 08:35 AM   HDL Cholesterol 38 12/08/2017 08:35 AM   LDL, calculated 103 12/08/2017 08:35 AM   LDL-C, External 114 04/11/2016   Triglyceride 127 12/08/2017 08:35 AM     Lab Results  Component Value Date/Time   GFR est non-AA 84 12/08/2017 08:35 AM   GFR est AA 96 12/08/2017 08:35 AM   Creatinine 0.82 12/08/2017 08:35 AM   BUN 20 12/08/2017 08:35 AM   Sodium 142 12/08/2017 08:35 AM   Potassium 4.5 12/08/2017 08:35 AM   Chloride 103 12/08/2017 08:35 AM   CO2 25 12/08/2017 08:35 AM          ROS    Physical Exam   Constitutional: She appears well-developed and well-nourished. Appears stated age   Cardiovascular: Normal rate, regular rhythm and normal heart sounds. Exam reveals no gallop and no friction rub. No murmur heard. Pulmonary/Chest: Effort normal and breath sounds normal. No respiratory distress. She has no wheezes. Abdominal: Soft. Bowel sounds are normal.   Musculoskeletal: She exhibits no edema. Neurological: She is alert. Psychiatric: She has a normal mood and affect. Nursing note and vitals reviewed. ASSESSMENT and PLAN  Diagnoses and all orders for this visit:    1. Other specified hypothyroidism   tsh wnl on current dose of armour thyroid  2. Prediabetes   Stable, continue weight reduction efforts  3. Convulsions, unspecified convulsion type ([address]   nonepileptic    Follow-up Disposition:  Return if symptoms worsen or fail to improve.

## 2020-01-10 NOTE — TELEPHONE ENCOUNTER
Unable to reach patient LVM to return call, Dr. Delroy Sheehan wants to see patient today please offer 145pm or 230pm slot if she calls back, or get me to the phone.
no suicidal ideation/no anxiety/no depression

## 2022-03-18 PROBLEM — E03.8 OTHER SPECIFIED HYPOTHYROIDISM: Status: ACTIVE | Noted: 2017-12-13

## 2022-03-20 PROBLEM — E66.01 MORBID OBESITY (HCC): Status: ACTIVE | Noted: 2017-06-08

## 2023-01-13 NOTE — PROGRESS NOTES
I called and spoke to Yamil Trinidad ( listed on HIPPA) to inform pt that chest x-ray was normal. VTE Assessment already completed for this visit

## 2023-05-18 RX ORDER — ALBUTEROL SULFATE 90 UG/1
1 AEROSOL, METERED RESPIRATORY (INHALATION) EVERY 4 HOURS PRN
COMMUNITY
Start: 2015-02-20

## 2023-05-18 RX ORDER — LEVOTHYROXINE AND LIOTHYRONINE 38; 9 UG/1; UG/1
15 TABLET ORAL DAILY
COMMUNITY
Start: 2017-10-31